# Patient Record
Sex: FEMALE | Race: WHITE | Employment: FULL TIME | URBAN - METROPOLITAN AREA
[De-identification: names, ages, dates, MRNs, and addresses within clinical notes are randomized per-mention and may not be internally consistent; named-entity substitution may affect disease eponyms.]

---

## 2020-04-16 ENCOUNTER — HOSPITAL ENCOUNTER (EMERGENCY)
Age: 20
Discharge: HOME | End: 2020-04-16
Attending: EMERGENCY MEDICINE
Payer: COMMERCIAL

## 2020-04-16 VITALS
OXYGEN SATURATION: 100 % | WEIGHT: 200 LBS | BODY MASS INDEX: 32.14 KG/M2 | HEIGHT: 66 IN | RESPIRATION RATE: 20 BRPM | TEMPERATURE: 99.3 F | DIASTOLIC BLOOD PRESSURE: 80 MMHG | HEART RATE: 110 BPM | SYSTOLIC BLOOD PRESSURE: 115 MMHG

## 2020-04-16 LAB
A/G RATIO: 1 (ref 1.1–2.2)
ALBUMIN SERPL-MCNC: 4 G/DL (ref 3.4–5)
ALP BLD-CCNC: 77 U/L (ref 40–129)
ALT SERPL-CCNC: 14 U/L (ref 10–40)
ANION GAP SERPL CALCULATED.3IONS-SCNC: 15 MMOL/L (ref 3–16)
AST SERPL-CCNC: 15 U/L (ref 15–37)
BACTERIA: ABNORMAL /HPF
BASOPHILS ABSOLUTE: 0.1 K/UL (ref 0–0.2)
BASOPHILS RELATIVE PERCENT: 1.1 %
BILIRUB SERPL-MCNC: 0.3 MG/DL (ref 0–1)
BILIRUBIN URINE: ABNORMAL
BLOOD, URINE: ABNORMAL
BUN BLDV-MCNC: 9 MG/DL (ref 7–20)
CALCIUM SERPL-MCNC: 9.3 MG/DL (ref 8.3–10.6)
CHLORIDE BLD-SCNC: 102 MMOL/L (ref 99–110)
CLARITY: CLEAR
CO2: 22 MMOL/L (ref 21–32)
COLOR: YELLOW
CREAT SERPL-MCNC: 0.6 MG/DL (ref 0.6–1.1)
EOSINOPHILS ABSOLUTE: 0.1 K/UL (ref 0–0.6)
EOSINOPHILS RELATIVE PERCENT: 0.9 %
EPITHELIAL CELLS, UA: ABNORMAL /HPF (ref 0–5)
GFR AFRICAN AMERICAN: >60
GFR NON-AFRICAN AMERICAN: >60
GLOBULIN: 4.1 G/DL
GLUCOSE BLD-MCNC: 115 MG/DL (ref 70–99)
GLUCOSE URINE: NEGATIVE MG/DL
HCG(URINE) PREGNANCY TEST: NEGATIVE
HCT VFR BLD CALC: 40.6 % (ref 36–48)
HEMOGLOBIN: 13.4 G/DL (ref 12–16)
KETONES, URINE: 40 MG/DL
LEUKOCYTE ESTERASE, URINE: NEGATIVE
LIPASE: 28 U/L (ref 13–60)
LYMPHOCYTES ABSOLUTE: 3.4 K/UL (ref 1–5.1)
LYMPHOCYTES RELATIVE PERCENT: 35 %
MCH RBC QN AUTO: 27.8 PG (ref 26–34)
MCHC RBC AUTO-ENTMCNC: 32.9 G/DL (ref 31–36)
MCV RBC AUTO: 84.6 FL (ref 80–100)
MICROSCOPIC EXAMINATION: YES
MONOCYTES ABSOLUTE: 1 K/UL (ref 0–1.3)
MONOCYTES RELATIVE PERCENT: 10.3 %
NEUTROPHILS ABSOLUTE: 5.2 K/UL (ref 1.7–7.7)
NEUTROPHILS RELATIVE PERCENT: 52.7 %
NITRITE, URINE: NEGATIVE
PDW BLD-RTO: 13.3 % (ref 12.4–15.4)
PH UA: 5.5 (ref 5–8)
PLATELET # BLD: 270 K/UL (ref 135–450)
PMV BLD AUTO: 8.8 FL (ref 5–10.5)
POTASSIUM SERPL-SCNC: 3.3 MMOL/L (ref 3.5–5.1)
PROTEIN UA: NEGATIVE MG/DL
RBC # BLD: 4.8 M/UL (ref 4–5.2)
RBC UA: ABNORMAL /HPF (ref 0–4)
SODIUM BLD-SCNC: 139 MMOL/L (ref 136–145)
SPECIFIC GRAVITY UA: >=1.03 (ref 1–1.03)
TOTAL PROTEIN: 8.1 G/DL (ref 6.4–8.2)
URINE REFLEX TO CULTURE: ABNORMAL
URINE TYPE: ABNORMAL
UROBILINOGEN, URINE: 0.2 E.U./DL
WBC # BLD: 9.8 K/UL (ref 4–11)
WBC UA: ABNORMAL /HPF (ref 0–5)

## 2020-04-16 PROCEDURE — 6370000000 HC RX 637 (ALT 250 FOR IP): Performed by: EMERGENCY MEDICINE

## 2020-04-16 PROCEDURE — 2580000003 HC RX 258: Performed by: EMERGENCY MEDICINE

## 2020-04-16 PROCEDURE — 80053 COMPREHEN METABOLIC PANEL: CPT

## 2020-04-16 PROCEDURE — 81001 URINALYSIS AUTO W/SCOPE: CPT

## 2020-04-16 PROCEDURE — 99283 EMERGENCY DEPT VISIT LOW MDM: CPT

## 2020-04-16 PROCEDURE — 83690 ASSAY OF LIPASE: CPT

## 2020-04-16 PROCEDURE — 84703 CHORIONIC GONADOTROPIN ASSAY: CPT

## 2020-04-16 PROCEDURE — 85025 COMPLETE CBC W/AUTO DIFF WBC: CPT

## 2020-04-16 RX ORDER — SUCRALFATE 1 G/1
1 TABLET ORAL 4 TIMES DAILY
Qty: 120 TABLET | Refills: 0 | Status: SHIPPED | OUTPATIENT
Start: 2020-04-16 | End: 2022-10-06

## 2020-04-16 RX ORDER — POTASSIUM CHLORIDE 20 MEQ/1
40 TABLET, EXTENDED RELEASE ORAL ONCE
Status: COMPLETED | OUTPATIENT
Start: 2020-04-16 | End: 2020-04-16

## 2020-04-16 RX ORDER — SUCRALFATE 1 G/1
1 TABLET ORAL ONCE
Status: COMPLETED | OUTPATIENT
Start: 2020-04-16 | End: 2020-04-16

## 2020-04-16 RX ORDER — MAGNESIUM HYDROXIDE/ALUMINUM HYDROXICE/SIMETHICONE 120; 1200; 1200 MG/30ML; MG/30ML; MG/30ML
5 SUSPENSION ORAL EVERY 6 HOURS PRN
Qty: 1 BOTTLE | Refills: 0 | Status: SHIPPED | OUTPATIENT
Start: 2020-04-16 | End: 2022-10-06

## 2020-04-16 RX ORDER — 0.9 % SODIUM CHLORIDE 0.9 %
1000 INTRAVENOUS SOLUTION INTRAVENOUS ONCE
Status: COMPLETED | OUTPATIENT
Start: 2020-04-16 | End: 2020-04-16

## 2020-04-16 RX ORDER — DROSPIRENONE AND ETHINYL ESTRADIOL 0.02-3(28)
1 KIT ORAL DAILY
COMMUNITY

## 2020-04-16 RX ADMIN — SUCRALFATE 1 G: 1 TABLET ORAL at 07:43

## 2020-04-16 RX ADMIN — LIDOCAINE HYDROCHLORIDE: 20 SOLUTION ORAL; TOPICAL at 07:44

## 2020-04-16 RX ADMIN — SODIUM CHLORIDE 1000 ML: 9 INJECTION, SOLUTION INTRAVENOUS at 07:47

## 2020-04-16 RX ADMIN — POTASSIUM CHLORIDE 40 MEQ: 1500 TABLET, EXTENDED RELEASE ORAL at 07:43

## 2020-04-16 SDOH — HEALTH STABILITY: MENTAL HEALTH: HOW OFTEN DO YOU HAVE A DRINK CONTAINING ALCOHOL?: NEVER

## 2020-04-16 ASSESSMENT — PAIN DESCRIPTION - DESCRIPTORS: DESCRIPTORS: ACHING

## 2020-04-16 ASSESSMENT — PAIN SCALES - GENERAL: PAINLEVEL_OUTOF10: 4

## 2020-04-16 ASSESSMENT — PAIN DESCRIPTION - LOCATION: LOCATION: ABDOMEN

## 2020-04-16 ASSESSMENT — PAIN DESCRIPTION - PAIN TYPE: TYPE: ACUTE PAIN

## 2020-04-16 ASSESSMENT — PAIN DESCRIPTION - FREQUENCY: FREQUENCY: INTERMITTENT

## 2020-04-16 ASSESSMENT — PAIN DESCRIPTION - ORIENTATION: ORIENTATION: UPPER

## 2020-04-16 NOTE — ED PROVIDER NOTES
[address]      CHIEF COMPLAINT  Abdominal Pain (pt comes in with complaints of epigastric pain since sunday night. Pt reports nausea but only clear \"spit up\" this morning. Pt has 99.3 temp here. )       HISTORY OF PRESENT ILLNESS  Bell Tang is a 20 y.o. female  who presents to the ED complaining of abdominal pain. Patient states is been occurring for the past 3 to 4 days. She describes a gnawing, ache in the midepigastrium region. She states it is occurring about 2 times per day. She states it is not necessarily worsened by food. She describes some mild nausea but is still eating normally. She denies any vomiting. She denies diarrhea. She denies fevers, chills, chest pain, or shortness of breath. She denies alcohol use. She does state that she takes frequent NSAIDs for headaches that she has chronically. Denies hematochezia or melena. She states she has had some intermittent issues with lower abdominal pain and diarrhea over the past 5 or 6 months, but nothing currently. Otherwise she states she is passing gas and has not had any abdominal surgeries. She denies dysuria. No other complaints, modifying factors or associated symptoms. I have reviewed the following from the nursing documentation. Past Medical History:   Diagnosis Date    Headache      Past Surgical History:   Procedure Laterality Date    WISDOM TOOTH EXTRACTION       History reviewed. No pertinent family history.   Social History     Socioeconomic History    Marital status: Single     Spouse name: Not on file    Number of children: Not on file    Years of education: Not on file    Highest education level: Not on file   Occupational History    Not on file   Social Needs    Financial resource strain: Not on file    Food insecurity     Worry: Not on file     Inability: Not on file    Transportation needs     Medical: Not on file     Non-medical: Not on file   Tobacco Use    Smoking status: Never Smoker    Smokeless tobacco: Never Used   Substance and Sexual Activity    Alcohol use: Never     Frequency: Never    Drug use: Never    Sexual activity: Not on file   Lifestyle    Physical activity     Days per week: Not on file     Minutes per session: Not on file    Stress: Not on file   Relationships    Social connections     Talks on phone: Not on file     Gets together: Not on file     Attends Judaism service: Not on file     Active member of club or organization: Not on file     Attends meetings of clubs or organizations: Not on file     Relationship status: Not on file    Intimate partner violence     Fear of current or ex partner: Not on file     Emotionally abused: Not on file     Physically abused: Not on file     Forced sexual activity: Not on file   Other Topics Concern    Not on file   Social History Narrative    Not on file     Current Facility-Administered Medications   Medication Dose Route Frequency Provider Last Rate Last Dose    0.9 % sodium chloride bolus  1,000 mL Intravenous Once [facility], DO 1,000 mL/hr at 04/16/20 0747 1,000 mL at 04/16/20 0747     Current Outpatient Medications   Medication Sig Dispense Refill    drospirenone-ethinyl estradiol (LUANNE) 3-0.02 MG per tablet Take 1 tablet by mouth daily      esomeprazole (NEXIUM) 20 MG delayed release capsule Take 1 capsule by mouth every morning (before breakfast) 30 capsule 0    sucralfate (CARAFATE) 1 GM tablet Take 1 tablet by mouth 4 times daily 120 tablet 0    aluminum & magnesium hydroxide-simethicone (MAALOX) 200-200-20 MG/5ML SUSP suspension Take 5 mLs by mouth every 6 hours as needed for Indigestion 1 Bottle 0     No Known Allergies    REVIEW OF SYSTEMS  10 systems reviewed, pertinent positives per HPI otherwise noted to be negative.     PHYSICAL EXAM  /77   Pulse 105   Temp 99.3 °F (37.4 °C) (Oral)   Resp 19   Ht 5' 6\" (1.676 m)   Wt 200 lb (90.7 kg)   LMP 03/25/2020   SpO2 100%   Breastfeeding No

## 2022-10-05 SDOH — HEALTH STABILITY: PHYSICAL HEALTH: ON AVERAGE, HOW MANY MINUTES DO YOU ENGAGE IN EXERCISE AT THIS LEVEL?: 20 MIN

## 2022-10-05 SDOH — HEALTH STABILITY: PHYSICAL HEALTH: ON AVERAGE, HOW MANY DAYS PER WEEK DO YOU ENGAGE IN MODERATE TO STRENUOUS EXERCISE (LIKE A BRISK WALK)?: 2 DAYS

## 2022-10-06 ENCOUNTER — OFFICE VISIT (OUTPATIENT)
Dept: PRIMARY CARE CLINIC | Age: 22
End: 2022-10-06
Payer: COMMERCIAL

## 2022-10-06 VITALS
HEART RATE: 96 BPM | WEIGHT: 200.4 LBS | DIASTOLIC BLOOD PRESSURE: 78 MMHG | HEIGHT: 66 IN | SYSTOLIC BLOOD PRESSURE: 118 MMHG | BODY MASS INDEX: 32.21 KG/M2

## 2022-10-06 DIAGNOSIS — E28.2 PCOS (POLYCYSTIC OVARIAN SYNDROME): ICD-10-CM

## 2022-10-06 DIAGNOSIS — E06.3 HASHIMOTO'S THYROIDITIS: ICD-10-CM

## 2022-10-06 DIAGNOSIS — Z76.89 ENCOUNTER TO ESTABLISH CARE: Primary | ICD-10-CM

## 2022-10-06 DIAGNOSIS — Z23 NEED FOR INFLUENZA VACCINATION: ICD-10-CM

## 2022-10-06 DIAGNOSIS — G90.A POTS (POSTURAL ORTHOSTATIC TACHYCARDIA SYNDROME): ICD-10-CM

## 2022-10-06 PROBLEM — M35.7 BENIGN JOINT HYPERMOBILITY: Status: ACTIVE | Noted: 2022-09-29

## 2022-10-06 PROBLEM — L68.0 HIRSUTISM: Status: ACTIVE | Noted: 2022-10-06

## 2022-10-06 PROBLEM — G43.009 MIGRAINE WITHOUT AURA: Status: ACTIVE | Noted: 2019-03-19

## 2022-10-06 PROCEDURE — G8482 FLU IMMUNIZE ORDER/ADMIN: HCPCS | Performed by: FAMILY MEDICINE

## 2022-10-06 PROCEDURE — 99214 OFFICE O/P EST MOD 30 MIN: CPT | Performed by: FAMILY MEDICINE

## 2022-10-06 PROCEDURE — 1036F TOBACCO NON-USER: CPT | Performed by: FAMILY MEDICINE

## 2022-10-06 PROCEDURE — G8417 CALC BMI ABV UP PARAM F/U: HCPCS | Performed by: FAMILY MEDICINE

## 2022-10-06 PROCEDURE — G8427 DOCREV CUR MEDS BY ELIG CLIN: HCPCS | Performed by: FAMILY MEDICINE

## 2022-10-06 PROCEDURE — 90471 IMMUNIZATION ADMIN: CPT | Performed by: FAMILY MEDICINE

## 2022-10-06 PROCEDURE — 90674 CCIIV4 VAC NO PRSV 0.5 ML IM: CPT | Performed by: FAMILY MEDICINE

## 2022-10-06 RX ORDER — METOPROLOL SUCCINATE 25 MG/1
25 TABLET, EXTENDED RELEASE ORAL DAILY
COMMUNITY
Start: 2022-08-04

## 2022-10-06 RX ORDER — TRIAMCINOLONE ACETONIDE 0.25 MG/G
CREAM TOPICAL
Qty: 15 G | Refills: 1 | Status: SHIPPED | OUTPATIENT
Start: 2022-10-06

## 2022-10-06 RX ORDER — SPIRONOLACTONE 25 MG/1
25 TABLET ORAL DAILY
Qty: 90 TABLET | Refills: 1 | Status: SHIPPED | OUTPATIENT
Start: 2022-10-06

## 2022-10-06 ASSESSMENT — PATIENT HEALTH QUESTIONNAIRE - PHQ9
SUM OF ALL RESPONSES TO PHQ QUESTIONS 1-9: 2
2. FEELING DOWN, DEPRESSED OR HOPELESS: 1
SUM OF ALL RESPONSES TO PHQ9 QUESTIONS 1 & 2: 2
1. LITTLE INTEREST OR PLEASURE IN DOING THINGS: 1
SUM OF ALL RESPONSES TO PHQ QUESTIONS 1-9: 2

## 2022-10-06 ASSESSMENT — ENCOUNTER SYMPTOMS
DIARRHEA: 0
CONSTIPATION: 0
EYE PAIN: 0
ABDOMINAL PAIN: 0
SHORTNESS OF BREATH: 0
COUGH: 0

## 2022-10-06 NOTE — PROGRESS NOTES
Chief Complaint   Patient presents with    Establish Care    Referral - General     Endo. ASSESSMENT/PLAN:  1. Encounter to establish care  VS reviewed     BMI reviewed   All questions answered. F/u discussed. Appropriate healthy lifestyle modifications discussed. 2. Need for influenza vaccination  Administered   - Influenza, FLUCELVAX, (age 10 mo+), IM, PF, 0.5 mL    3. PCOS (polycystic ovarian syndrome)  Diagnosed yrs ago  Periods irregular - BC helps. Follow up PRN  Acne, hirsutism - will try angela, low dose, and follow up in 4-6 weeks to gauge improvement   - Meri Perea MD, Endocrinology, [facility]  - spironolactone (ALDACTONE) 25 MG tablet; Take 1 tablet by mouth daily  Dispense: 90 tablet; Refill: 1    4. POTS (postural orthostatic tachycardia syndrome)  On metoprolol and doing well. Feels less intense flares of headaches and high blood pressure. Will continue monitoring  BP normal today  - CBC with Auto Differential; Future  - Comprehensive Metabolic Panel; Future    5. Hashimoto's thyroiditis - goiter  Ref placed to endo  Not a clear diagnosis in chart. Update labs  - TSH with Reflex; Future  - THYROID PEROXIDASE ANTIBODY; Future  - Meir Perea MD, Endocrinology, [facility]  - T4, Free; Future  - T3; Future         HPI:  Dario Choi is a 20 y.o. (: 2000) here today to establish care. Reviewed past medical history; POTS, Hashimoto's (unclear diagnosis), PCOS. Would like a referral to endocrinology. Currently, feeling ok but worried about weight fluctuations. No dietary changes and no change in activity, but will gain sometimes 20 pounds in 2-3 months. Discussed diet and activity levels and typical meals. She doesn't follow serving sizes with starches and carbs closely and may be more sensitive to gain in the setting of PCOS and thyroid disease.      Would like to update flu shot    Review of Systems   Constitutional:  Negative for appetite change, chills, fatigue and fever. HENT:  Negative for congestion. Eyes:  Negative for pain and visual disturbance. Respiratory:  Negative for cough and shortness of breath. Cardiovascular:  Negative for chest pain and palpitations. Gastrointestinal:  Negative for abdominal pain, constipation and diarrhea. Genitourinary:  Negative for difficulty urinating. Musculoskeletal:  Negative for arthralgias. Skin:  Negative for rash and wound. Neurological:  Negative for dizziness, weakness, light-headedness and headaches. Hematological:  Does not bruise/bleed easily. Psychiatric/Behavioral:  Negative for behavioral problems. Past Medical History:   Diagnosis Date    ADHD (attention deficit hyperactivity disorder)     Not formally diagnosed, psych thinks I maybe have it. Have been prescribed Adderal and Ritalin in the past    Anxiety     Depression     GERD (gastroesophageal reflux disease)     Headache     Hypothyroidism     Goiter, blood work confirmed first in 2015    Irritable bowel syndrome     PCOS (polycystic ovarian syndrome)     POTS (postural orthostatic tachycardia syndrome)        Family History   Adopted: Yes   Problem Relation Age of Onset    Other Mother         No family history available - adopted       Social History     Tobacco Use    Smoking status: Never    Smokeless tobacco: Never   Vaping Use    Vaping Use: Never used   Substance Use Topics    Alcohol use: Yes     Comment: Occasionally/socially    Drug use: Never       New Prescriptions    SPIRONOLACTONE (ALDACTONE) 25 MG TABLET    Take 1 tablet by mouth daily    TRIAMCINOLONE (KENALOG) 0.025 % CREAM    Apply topically 2 times daily.        Meds Prior to visit:  Current Outpatient Medications on File Prior to Visit   Medication Sig Dispense Refill    metoprolol succinate (TOPROL XL) 25 MG extended release tablet Take 25 mg by mouth daily      drospirenone-ethinyl estradiol (LUANNE) 3-0.02 MG per tablet Take 1 tablet by mouth daily       No current facility-administered medications on file prior to visit. No Known Allergies    OBJECTIVE:  /78   Pulse 96   Ht 5' 6\" (1.676 m)   Wt 200 lb 6.4 oz (90.9 kg)   LMP 09/26/2022   BMI 32.35 kg/m²   BP Readings from Last 2 Encounters:   10/06/22 118/78   04/16/20 115/80     Wt Readings from Last 3 Encounters:   10/06/22 200 lb 6.4 oz (90.9 kg)   04/16/20 200 lb (90.7 kg)       Physical Exam  Vitals reviewed. Constitutional:       General: She is not in acute distress. Appearance: Normal appearance. She is obese. She is not ill-appearing. HENT:      Head: Normocephalic and atraumatic. Right Ear: External ear normal.      Left Ear: External ear normal.      Nose: Nose normal. No congestion. Mouth/Throat:      Mouth: Mucous membranes are moist.      Pharynx: Oropharynx is clear. No oropharyngeal exudate. Eyes:      Extraocular Movements: Extraocular movements intact. Conjunctiva/sclera: Conjunctivae normal.      Pupils: Pupils are equal, round, and reactive to light. Cardiovascular:      Rate and Rhythm: Normal rate and regular rhythm. Pulses: Normal pulses. Heart sounds: Normal heart sounds. Pulmonary:      Effort: Pulmonary effort is normal. No respiratory distress. Breath sounds: Normal breath sounds. No stridor. No wheezing, rhonchi or rales. Abdominal:      General: Bowel sounds are normal.      Palpations: Abdomen is soft. Tenderness: There is no abdominal tenderness. Musculoskeletal:         General: Normal range of motion. Cervical back: Normal range of motion and neck supple. Right lower leg: No edema. Left lower leg: No edema. Skin:     General: Skin is warm. Capillary Refill: Capillary refill takes less than 2 seconds. Findings: No erythema or rash. Neurological:      General: No focal deficit present. Mental Status: She is alert and oriented to person, place, and time.  Mental status is at baseline. Motor: No weakness. Coordination: Coordination normal.      Gait: Gait normal.   Psychiatric:         Mood and Affect: Mood normal.         Behavior: Behavior normal.         Thought Content: Thought content normal.         Judgment: Judgment normal.       Lab Results   Component Value Date    WBC 9.8 04/16/2020    HGB 13.4 04/16/2020    HCT 40.6 04/16/2020    MCV 84.6 04/16/2020     04/16/2020     Lab Results   Component Value Date     04/16/2020    K 3.3 (L) 04/16/2020     04/16/2020    CO2 22 04/16/2020    BUN 9 04/16/2020    CREATININE 0.6 04/16/2020    GLUCOSE 115 (H) 04/16/2020    CALCIUM 9.3 04/16/2020    PROT 8.1 04/16/2020    LABALBU 4.0 04/16/2020    BILITOT 0.3 04/16/2020    ALKPHOS 77 04/16/2020    AST 15 04/16/2020    ALT 14 04/16/2020    LABGLOM >60 04/16/2020    GFRAA >60 04/16/2020    AGRATIO 1.0 (L) 04/16/2020    GLOB 4.1 04/16/2020       Discussed use, benefit, and side effects of prescribed medications. Barriers to medication compliance addressed. All patient questions answered. Pt voiced understanding. RTC Return in about 6 weeks (around 11/17/2022). No future appointments.     Branden Turcios MD  10/6/2022  11:39 AM

## 2022-10-31 DIAGNOSIS — E28.2 PCOS (POLYCYSTIC OVARIAN SYNDROME): Primary | ICD-10-CM

## 2023-02-14 ENCOUNTER — OFFICE VISIT (OUTPATIENT)
Dept: ENDOCRINOLOGY | Age: 23
End: 2023-02-14
Payer: COMMERCIAL

## 2023-02-14 VITALS
HEART RATE: 92 BPM | TEMPERATURE: 98 F | OXYGEN SATURATION: 99 % | BODY MASS INDEX: 33.91 KG/M2 | WEIGHT: 211 LBS | DIASTOLIC BLOOD PRESSURE: 79 MMHG | SYSTOLIC BLOOD PRESSURE: 125 MMHG | HEIGHT: 66 IN | RESPIRATION RATE: 14 BRPM

## 2023-02-14 DIAGNOSIS — E06.3 HASHIMOTO'S THYROIDITIS: ICD-10-CM

## 2023-02-14 DIAGNOSIS — E28.2 PCOS (POLYCYSTIC OVARIAN SYNDROME): Primary | ICD-10-CM

## 2023-02-14 DIAGNOSIS — E66.9 CLASS 1 OBESITY WITH BODY MASS INDEX (BMI) OF 34.0 TO 34.9 IN ADULT, UNSPECIFIED OBESITY TYPE, UNSPECIFIED WHETHER SERIOUS COMORBIDITY PRESENT: ICD-10-CM

## 2023-02-14 DIAGNOSIS — E04.9 GOITER: ICD-10-CM

## 2023-02-14 DIAGNOSIS — R13.10 DYSPHAGIA, UNSPECIFIED TYPE: ICD-10-CM

## 2023-02-14 DIAGNOSIS — E28.8 HYPERANDROGENISM: ICD-10-CM

## 2023-02-14 DIAGNOSIS — R63.5 WEIGHT GAIN, ABNORMAL: ICD-10-CM

## 2023-02-14 DIAGNOSIS — N91.5 OLIGOMENORRHEA, UNSPECIFIED TYPE: ICD-10-CM

## 2023-02-14 DIAGNOSIS — R00.2 PALPITATIONS: ICD-10-CM

## 2023-02-14 DIAGNOSIS — E88.81 INSULIN RESISTANCE: ICD-10-CM

## 2023-02-14 PROBLEM — E66.811 CLASS 1 OBESITY WITH BODY MASS INDEX (BMI) OF 32.0 TO 32.9 IN ADULT: Status: ACTIVE | Noted: 2023-02-14

## 2023-02-14 PROBLEM — E88.819 INSULIN RESISTANCE: Status: ACTIVE | Noted: 2023-02-14

## 2023-02-14 PROCEDURE — 99205 OFFICE O/P NEW HI 60 MIN: CPT | Performed by: INTERNAL MEDICINE

## 2023-02-14 RX ORDER — DEXAMETHASONE 1 MG
TABLET ORAL
Qty: 1 TABLET | Refills: 0 | Status: SHIPPED | OUTPATIENT
Start: 2023-02-14
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
## 2023-02-24 ENCOUNTER — HOSPITAL ENCOUNTER (OUTPATIENT)
Dept: ULTRASOUND IMAGING | Age: 23
Discharge: HOME | End: 2023-02-24
Payer: COMMERCIAL

## 2023-02-24 DIAGNOSIS — E04.9 GOITER: ICD-10-CM

## 2023-02-24 DIAGNOSIS — R13.10 DYSPHAGIA, UNSPECIFIED TYPE: ICD-10-CM

## 2023-02-24 DIAGNOSIS — N91.5 OLIGOMENORRHEA, UNSPECIFIED TYPE: ICD-10-CM

## 2023-02-24 PROCEDURE — 76536 US EXAM OF HEAD AND NECK: CPT

## 2023-03-16 DIAGNOSIS — R00.2 PALPITATIONS: ICD-10-CM

## 2023-03-16 DIAGNOSIS — E28.8 HYPERANDROGENISM: ICD-10-CM

## 2023-03-16 DIAGNOSIS — E88.81 INSULIN RESISTANCE: ICD-10-CM

## 2023-03-16 DIAGNOSIS — E06.3 HASHIMOTO'S THYROIDITIS: ICD-10-CM

## 2023-03-16 DIAGNOSIS — R63.5 WEIGHT GAIN, ABNORMAL: ICD-10-CM

## 2023-03-16 DIAGNOSIS — E28.2 PCOS (POLYCYSTIC OVARIAN SYNDROME): ICD-10-CM

## 2023-03-16 DIAGNOSIS — N91.5 OLIGOMENORRHEA, UNSPECIFIED TYPE: ICD-10-CM

## 2023-03-16 DIAGNOSIS — E04.9 GOITER: ICD-10-CM

## 2023-03-16 DIAGNOSIS — E66.9 CLASS 1 OBESITY WITH BODY MASS INDEX (BMI) OF 34.0 TO 34.9 IN ADULT, UNSPECIFIED OBESITY TYPE, UNSPECIFIED WHETHER SERIOUS COMORBIDITY PRESENT: ICD-10-CM

## 2023-03-16 LAB
T3 SERPL-MCNC: 1.49 NG/ML (ref 0.8–2)
T3FREE SERPL-MCNC: 3.7 PG/ML (ref 2.3–4.2)
T4 FREE SERPL-MCNC: 1.2 NG/DL (ref 0.9–1.8)
T4 SERPL-MCNC: 7.1 UG/DL (ref 4.5–10.9)
TSH SERPL DL<=0.005 MIU/L-ACNC: 2.97 UIU/ML (ref 0.27–4.2)

## 2023-03-17 ENCOUNTER — TELEPHONE (OUTPATIENT)
Dept: ENDOCRINOLOGY | Age: 23
End: 2023-03-17

## 2023-03-17 DIAGNOSIS — N91.5 OLIGOMENORRHEA, UNSPECIFIED TYPE: ICD-10-CM

## 2023-03-17 DIAGNOSIS — E28.2 PCOS (POLYCYSTIC OVARIAN SYNDROME): ICD-10-CM

## 2023-03-17 DIAGNOSIS — R63.5 WEIGHT GAIN, ABNORMAL: ICD-10-CM

## 2023-03-17 LAB
ALBUMIN SERPL-MCNC: 4.2 G/DL (ref 3.4–5)
ALBUMIN/GLOB SERPL: 1.5 {RATIO} (ref 1.1–2.2)
ALP SERPL-CCNC: 80 U/L (ref 40–129)
ALT SERPL-CCNC: 53 U/L (ref 10–40)
ANION GAP SERPL CALCULATED.3IONS-SCNC: 14 MMOL/L (ref 3–16)
ANTI-THYROGLOB ABS: 22 IU/ML
AST SERPL-CCNC: 34 U/L (ref 15–37)
BILIRUB SERPL-MCNC: <0.2 MG/DL (ref 0–1)
BUN SERPL-MCNC: 12 MG/DL (ref 7–20)
CALCIUM SERPL-MCNC: 9.7 MG/DL (ref 8.3–10.6)
CHLORIDE SERPL-SCNC: 104 MMOL/L (ref 99–110)
CHOLEST SERPL-MCNC: 264 MG/DL (ref 0–199)
CO2 SERPL-SCNC: 25 MMOL/L (ref 21–32)
CORTIS AM PEAK SERPL-MCNC: 18.6 UG/DL (ref 4.3–22.4)
CORTIS AM PEAK SERPL-MCNC: <0.8 UG/DL (ref 4.3–22.4)
CREAT SERPL-MCNC: 0.5 MG/DL (ref 0.6–1.1)
EST. AVERAGE GLUCOSE BLD GHB EST-MCNC: 108.3 MG/DL
ESTRADIOL SERPL-MCNC: 117 PG/ML
FSH SERPL-ACNC: 3 MIU/ML
GFR SERPLBLD CREATININE-BSD FMLA CKD-EPI: >60 ML/MIN/{1.73_M2}
GLUCOSE SERPL-MCNC: 102 MG/DL (ref 70–99)
HBA1C MFR BLD: 5.4 %
HDLC SERPL-MCNC: 72 MG/DL (ref 40–60)
LDL CHOLESTEROL CALCULATED: 179 MG/DL
LH SERPL-ACNC: 8.5 MIU/ML
POTASSIUM SERPL-SCNC: 5.2 MMOL/L (ref 3.5–5.1)
PROGEST SERPL-MCNC: 5.36 NG/ML
PROLACTIN SERPL IA-MCNC: 12.8 NG/ML
PROT SERPL-MCNC: 7 G/DL (ref 6.4–8.2)
SODIUM SERPL-SCNC: 143 MMOL/L (ref 136–145)
THYROPEROXIDASE AB SERPL IA-ACNC: 349 IU/ML
TRIGL SERPL-MCNC: 65 MG/DL (ref 0–150)
VLDLC SERPL CALC-MCNC: 13 MG/DL

## 2023-03-17 NOTE — TELEPHONE ENCOUNTER
Call from [facility] lab with critical lab result     Critical lab: Cortisol AM less than 0.8     Please advise

## 2023-03-18 LAB
C PEPTIDE SERPL-MCNC: 2.9 NG/ML (ref 1.1–4.4)
IGF-I SERPL-MCNC: 448 NG/ML (ref 103–326)
IGF-I Z-SCORE SERPL: 2.7
INSULIN COMMENT: NORMAL
INSULIN REFERENCE RANGE:: NORMAL
INSULIN SERPL-ACNC: 23.5 MU/L
SHBG SERPL-SCNC: 62 NMOL/L (ref 30–135)
TESTOST FREE SERPL-MCNC: 6.5 PG/ML (ref 0.8–7.4)
TESTOST SERPL-MCNC: 55 NG/DL (ref 20–70)

## 2023-03-19 LAB — PROINSULIN P 12H FAST SERPL-SCNC: <1.6 PMOL/L

## 2023-03-20 LAB
ACTH PLAS-MCNC: 49 PG/ML (ref 6–58)
ANNOTATION COMMENT IMP: NORMAL
DHEA-S SERPL-MCNC: 213 UG/DL (ref 65–380)
METANEPHS SERPL-SCNC: 0.18 NMOL/L (ref 0–0.49)
NORMETANEPHRINE SERPL-SCNC: 0.49 NMOL/L (ref 0–0.89)

## 2023-03-21 LAB
17OHP SERPL-MCNC: 119.04 NG/DL
CORTIS SAL-MCNC: 0.06 UG/DL
CORTIS SAL-MCNC: 0.06 UG/DL

## 2023-03-22 LAB — ACTH PLAS-MCNC: <5 PG/ML (ref 6–58)

## 2023-03-30 ENCOUNTER — OFFICE VISIT (OUTPATIENT)
Dept: ENDOCRINOLOGY | Age: 23
End: 2023-03-30
Payer: COMMERCIAL

## 2023-03-30 VITALS
RESPIRATION RATE: 14 BRPM | DIASTOLIC BLOOD PRESSURE: 81 MMHG | SYSTOLIC BLOOD PRESSURE: 125 MMHG | HEART RATE: 95 BPM | WEIGHT: 222 LBS | HEIGHT: 66 IN | OXYGEN SATURATION: 98 % | TEMPERATURE: 98 F | BODY MASS INDEX: 35.68 KG/M2

## 2023-03-30 DIAGNOSIS — R94.6 ABNORMAL THYROID FUNCTION TEST: ICD-10-CM

## 2023-03-30 DIAGNOSIS — R00.2 PALPITATIONS: ICD-10-CM

## 2023-03-30 DIAGNOSIS — E06.3 HASHIMOTO'S THYROIDITIS: ICD-10-CM

## 2023-03-30 DIAGNOSIS — E66.01 CLASS 2 SEVERE OBESITY WITH SERIOUS COMORBIDITY AND BODY MASS INDEX (BMI) OF 35.0 TO 35.9 IN ADULT, UNSPECIFIED OBESITY TYPE (HCC): ICD-10-CM

## 2023-03-30 DIAGNOSIS — E28.2 PCOS (POLYCYSTIC OVARIAN SYNDROME): Primary | ICD-10-CM

## 2023-03-30 DIAGNOSIS — E28.8 HYPERANDROGENISM: ICD-10-CM

## 2023-03-30 DIAGNOSIS — R13.10 DYSPHAGIA, UNSPECIFIED TYPE: ICD-10-CM

## 2023-03-30 DIAGNOSIS — R79.89 ELEVATED INSULIN-LIKE GROWTH FACTOR 1 (IGF-1) LEVEL: ICD-10-CM

## 2023-03-30 DIAGNOSIS — E88.81 INSULIN RESISTANCE: ICD-10-CM

## 2023-03-30 DIAGNOSIS — N91.5 OLIGOMENORRHEA, UNSPECIFIED TYPE: ICD-10-CM

## 2023-03-30 DIAGNOSIS — E78.2 MIXED HYPERLIPIDEMIA: ICD-10-CM

## 2023-03-30 DIAGNOSIS — R74.8 ELEVATED LIVER ENZYMES: ICD-10-CM

## 2023-03-30 DIAGNOSIS — R63.5 WEIGHT GAIN, ABNORMAL: ICD-10-CM

## 2023-03-30 PROBLEM — E66.9 CLASS 1 OBESITY WITH BODY MASS INDEX (BMI) OF 34.0 TO 34.9 IN ADULT: Status: ACTIVE | Noted: 2023-03-30

## 2023-03-30 PROBLEM — E66.811 CLASS 1 OBESITY WITH BODY MASS INDEX (BMI) OF 34.0 TO 34.9 IN ADULT: Status: ACTIVE | Noted: 2023-03-30

## 2023-03-30 PROCEDURE — 99215 OFFICE O/P EST HI 40 MIN: CPT | Performed by: INTERNAL MEDICINE

## 2023-03-30 RX ORDER — METFORMIN HYDROCHLORIDE 500 MG/1
1000 TABLET, EXTENDED RELEASE ORAL
Qty: 360 TABLET | Refills: 1 | Status: SHIPPED | OUTPATIENT
Start: 2023-03-30

## 2023-03-30 NOTE — PROGRESS NOTES
Component Value Date/Time    LABVLDL 13 03/16/2023 10:02 AM     No results found for: [facility]  Lab Results   Component Value Date/Time    LABMICR YES 04/16/2020 06:55 AM     No results found for: VITD25     ASSESSMENT/PLAN:  1. PCOS (polycystic ovarian syndrome)  Start metformin  mg 1 tablet bid, increase if tolerated  Did lab work when off birth control pills for 4 weeks. 17 hydroxyprogesterone normal  Hemoglobin A1c 5.4  - DHEA-Sulfate; Future  - Testosterone, free, total; Future  - Insulin, total; Future  - Comprehensive Metabolic Panel; Future  - Lipid, Fasting; Future  - Hemoglobin A1C; Future    2. Hashimoto's thyroiditis  TSH 1.55-2.97  Thyroglobulin antibody normal  TPO antibody 81.6-349  - T3, Free; Future  - T4, Free; Future  - TSH; Future  - Anti-Thyroglobulin Antibody; Future  - Thyroid Peroxidase Antibody; Future    3. Hyperandrogenism  DHEA-S 403, elevated, repeated 213, normal  Testosterone 55  Free testosterone 6.5  Hold the spironolactone for now, resume when back on birth control pills. - Cortisol AM, Total; Future  - DHEA-Sulfate; Future  - Testosterone, free, total; Future    4. Obesity  Diet and exercise    5. Weight gain, abnormal  Normal 3 salivary cortisol samples  Normal 1 mg overnight dexamethasone suppression test  ACTH 49-<5  Cortisol 18.6-<0.8  Salivary cortisol 0.06-0.064-0.024  Diet and exercise    6. Insulin resistance  Hemoglobin A1c 5.4  Glucose 102  Insulin 23.5  Counseled patient about metformin. Start metformin, increase as tolerated  Diet, exercise  Monitor blood glucose  - Insulin, total; Future  - Comprehensive Metabolic Panel; Future    7. Dysphagia, unspecified type  Obtain thyroid sonogram  - US HEAD NECK SOFT TISSUE THYROID; Future    8. Palpitations  Plasma metanephrines normal  Follow-up with cardiology  - Metanephrines Plasma Free; Future    9.  Oligomenorrhea, unspecified type  Estradiol 117  LH 25.7-8.5  FSH 7.5-3.0  Prolactin 12.8  - DHEA-Sulfate;

## 2023-06-21 LAB
A/G RATIO: 1.4 (CALC) (ref 1–2.5)
ALBUMIN SERPL-MCNC: 4.3 G/DL (ref 3.6–5.1)
ALP BLD-CCNC: 73 U/L (ref 31–125)
ALT SERPL-CCNC: 34 U/L (ref 6–29)
AST SERPL-CCNC: 25 U/L (ref 10–30)
BILIRUB SERPL-MCNC: 0.3 MG/DL (ref 0.2–1.2)
BUN / CREAT RATIO: ABNORMAL (CALC) (ref 6–22)
BUN BLDV-MCNC: 12 MG/DL (ref 7–25)
CALCIUM SERPL-MCNC: 9.8 MG/DL (ref 8.6–10.2)
CHLORIDE BLD-SCNC: 102 MMOL/L (ref 98–110)
CHOLESTEROL, TOTAL: 223 MG/DL
CHOLESTEROL/HDL RATIO: 2.9 (CALC)
CO2: 27 MMOL/L (ref 20–32)
CREAT SERPL-MCNC: 0.6 MG/DL (ref 0.5–0.96)
ESTIMATED GLOMERULAR FILTRATION RATE CREATININE EQUATION: 129 ML/MIN/1.73M2
GLOBULIN: 3.1 G/DL (CALC) (ref 1.9–3.7)
GLUCOSE BLD-MCNC: 94 MG/DL (ref 65–99)
HDLC SERPL-MCNC: 78 MG/DL
INSULIN-LIKE GROWTH FACTOR-1 Z-SCORE: 0.5 SD
INSULIN-LIKE GROWTH FACTOR-1: 254 NG/ML (ref 83–456)
LDL CHOLESTEROL CALCULATED: 131 MG/DL (CALC)
NONHDLC SERPL-MCNC: 145 MG/DL (CALC)
POTASSIUM SERPL-SCNC: 4.4 MMOL/L (ref 3.5–5.3)
SODIUM BLD-SCNC: 139 MMOL/L (ref 135–146)
T3 FREE: 3.3 PG/ML (ref 2.3–4.2)
T4 FREE: 1.6 NG/DL (ref 0.8–1.8)
TOTAL PROTEIN: 7.4 G/DL (ref 6.1–8.1)
TRIGL SERPL-MCNC: 46 MG/DL
TSH ULTRASENSITIVE: 3.01 MIU/L

## 2023-06-22 LAB
COLLECT DURATION TIME SPEC: 24 H
CORTIS F 24H UR HPLC-MCNC: 12.9 UG/L
CORTIS F 24H UR-MRATE: 24.5 UG/D
CORTIS F/CREAT 24H UR: 18.43 UG/G CRT
CREAT 24H UR-MCNC: 70 MG/DL
CREAT 24H UR-MRATE: 1330 MG/D (ref 700–1600)
IMP & REVIEW OF LAB RESULTS: NORMAL
SPECIMEN VOL ?TM UR: 1900 ML

## 2023-06-25 PROBLEM — E66.812 CLASS 2 SEVERE OBESITY WITH SERIOUS COMORBIDITY AND BODY MASS INDEX (BMI) OF 35.0 TO 35.9 IN ADULT (HCC): Status: ACTIVE | Noted: 2023-06-25

## 2023-06-25 PROBLEM — E66.01 CLASS 2 SEVERE OBESITY WITH SERIOUS COMORBIDITY AND BODY MASS INDEX (BMI) OF 35.0 TO 35.9 IN ADULT (HCC): Status: ACTIVE | Noted: 2023-06-25

## 2023-06-26 ENCOUNTER — OFFICE VISIT (OUTPATIENT)
Dept: ENDOCRINOLOGY | Age: 23
End: 2023-06-26
Payer: COMMERCIAL

## 2023-06-26 VITALS
HEIGHT: 66 IN | RESPIRATION RATE: 14 BRPM | HEART RATE: 88 BPM | SYSTOLIC BLOOD PRESSURE: 126 MMHG | DIASTOLIC BLOOD PRESSURE: 77 MMHG | BODY MASS INDEX: 35.84 KG/M2 | WEIGHT: 223 LBS | TEMPERATURE: 98 F | OXYGEN SATURATION: 95 %

## 2023-06-26 DIAGNOSIS — R79.89 ELEVATED INSULIN-LIKE GROWTH FACTOR 1 (IGF-1) LEVEL: ICD-10-CM

## 2023-06-26 DIAGNOSIS — E66.01 CLASS 2 SEVERE OBESITY WITH SERIOUS COMORBIDITY AND BODY MASS INDEX (BMI) OF 35.0 TO 35.9 IN ADULT, UNSPECIFIED OBESITY TYPE (HCC): ICD-10-CM

## 2023-06-26 DIAGNOSIS — E88.81 INSULIN RESISTANCE: ICD-10-CM

## 2023-06-26 DIAGNOSIS — R74.8 ELEVATED LIVER ENZYMES: ICD-10-CM

## 2023-06-26 DIAGNOSIS — E28.2 PCOS (POLYCYSTIC OVARIAN SYNDROME): Primary | ICD-10-CM

## 2023-06-26 DIAGNOSIS — E06.3 HASHIMOTO'S THYROIDITIS: ICD-10-CM

## 2023-06-26 DIAGNOSIS — R94.6 ABNORMAL THYROID FUNCTION TEST: ICD-10-CM

## 2023-06-26 DIAGNOSIS — R00.2 PALPITATIONS: ICD-10-CM

## 2023-06-26 DIAGNOSIS — R63.5 WEIGHT GAIN, ABNORMAL: ICD-10-CM

## 2023-06-26 DIAGNOSIS — N91.5 OLIGOMENORRHEA, UNSPECIFIED TYPE: ICD-10-CM

## 2023-06-26 DIAGNOSIS — E78.2 MIXED HYPERLIPIDEMIA: ICD-10-CM

## 2023-06-26 DIAGNOSIS — E28.8 HYPERANDROGENISM: ICD-10-CM

## 2023-06-26 DIAGNOSIS — R13.10 DYSPHAGIA, UNSPECIFIED TYPE: ICD-10-CM

## 2023-06-26 PROCEDURE — 99215 OFFICE O/P EST HI 40 MIN: CPT | Performed by: INTERNAL MEDICINE

## 2023-06-26 RX ORDER — SEMAGLUTIDE 0.25 MG/.5ML
0.25 INJECTION, SOLUTION SUBCUTANEOUS
Qty: 2 ML | Refills: 0 | Status: SHIPPED | OUTPATIENT
Start: 2023-06-26

## 2023-06-26 RX ORDER — METFORMIN HYDROCHLORIDE 500 MG/1
1000 TABLET, EXTENDED RELEASE ORAL 2 TIMES DAILY
Qty: 360 TABLET | Refills: 1 | Status: SHIPPED | OUTPATIENT
Start: 2023-06-26

## 2023-07-21 DIAGNOSIS — R13.10 DYSPHAGIA, UNSPECIFIED TYPE: ICD-10-CM

## 2023-07-21 DIAGNOSIS — N91.5 OLIGOMENORRHEA, UNSPECIFIED TYPE: ICD-10-CM

## 2023-07-21 DIAGNOSIS — E28.2 PCOS (POLYCYSTIC OVARIAN SYNDROME): ICD-10-CM

## 2023-07-21 DIAGNOSIS — R74.8 ELEVATED LIVER ENZYMES: ICD-10-CM

## 2023-07-21 DIAGNOSIS — E28.8 HYPERANDROGENISM: ICD-10-CM

## 2023-07-21 DIAGNOSIS — R79.89 ELEVATED INSULIN-LIKE GROWTH FACTOR 1 (IGF-1) LEVEL: ICD-10-CM

## 2023-07-21 DIAGNOSIS — R00.2 PALPITATIONS: ICD-10-CM

## 2023-07-21 DIAGNOSIS — E06.3 HASHIMOTO'S THYROIDITIS: ICD-10-CM

## 2023-07-21 DIAGNOSIS — E88.81 INSULIN RESISTANCE: ICD-10-CM

## 2023-07-24 LAB
CORTIS SAL-MCNC: 0.06 UG/DL
CORTIS SAL-MCNC: 0.08 UG/DL

## 2023-07-25 ENCOUNTER — PATIENT MESSAGE (OUTPATIENT)
Dept: ENDOCRINOLOGY | Age: 23
End: 2023-07-25

## 2023-07-25 DIAGNOSIS — R63.5 WEIGHT GAIN, ABNORMAL: ICD-10-CM

## 2023-07-25 DIAGNOSIS — N91.5 OLIGOMENORRHEA, UNSPECIFIED TYPE: ICD-10-CM

## 2023-07-25 DIAGNOSIS — E28.2 PCOS (POLYCYSTIC OVARIAN SYNDROME): ICD-10-CM

## 2023-07-25 DIAGNOSIS — E28.8 HYPERANDROGENISM: Primary | ICD-10-CM

## 2023-07-25 NOTE — TELEPHONE ENCOUNTER
Are you able to scan and attach my 3rd saliva test result from last week for my records? I got two back, but I know we had trouble getting the third in the past. My next appointment isn't until end of Sept, and the results are very helpful in keeping track of which symptoms are indicative of a low vs high. I'm also interested in taking more saliva tests to keep learning about my symptoms. I'm currently journaling all of my symptoms every day, and I'd love to take a test every day for a week, or even a month, to get more data points to connect to my symptoms. Is Dr. Daphnie Flores able to order those, or even have some sort of open order for saliva tests?

## 2023-07-25 NOTE — TELEPHONE ENCOUNTER
From: Juliocesar Guthrie  To: Dr. Pauly Ugalde: 7/25/2023 8:49 AM EDT  Subject: 3rd Saliva Result + Additional    Xavier Seymour/Dr. Lilliam Smith! Are you able to scan and attach my 3rd saliva test result from last week for my records? I got two back, but I know we had trouble getting the third in the past. My next appointment isn't until end of Sept, and the results are very helpful in keeping track of which symptoms are indicative of a low vs high. I'm also interested in taking more saliva tests to keep learning about my symptoms. I'm currently journaling all of my symptoms every day, and I'd love to take a test every day for a week, or even a month, to get more data points to connect to my symptoms. Is Dr. Lilliam Smith able to order those, or even have some sort of open order for saliva tests?

## 2023-07-25 NOTE — TELEPHONE ENCOUNTER
I ordered 3 additional saliva cortisol test and 24-hour urine cortisol. Please do these tests only when you are symptomatic with elevated blood pressure, weight gain, or other symptoms of Cushing's following period without symptoms. Were you able to schedule appointment with Cushing's specialist?  Let me know when, so I can await report.     I will not be ordering any more testing, you will need to address further plan with Cushing's specialist.

## 2023-07-27 NOTE — TELEPHONE ENCOUNTER
ANA  Thank you Sandra! I do have appointments scheduled with [facility] for October 27th and [address] [place] [address] in March 2024.

## 2023-07-31 DIAGNOSIS — R74.8 ELEVATED LIVER ENZYMES: ICD-10-CM

## 2023-07-31 DIAGNOSIS — E28.2 PCOS (POLYCYSTIC OVARIAN SYNDROME): ICD-10-CM

## 2023-07-31 DIAGNOSIS — E88.819 INSULIN RESISTANCE: ICD-10-CM

## 2023-07-31 DIAGNOSIS — E28.8 HYPERANDROGENISM: ICD-10-CM

## 2023-07-31 DIAGNOSIS — R79.89 ELEVATED INSULIN-LIKE GROWTH FACTOR 1 (IGF-1) LEVEL: ICD-10-CM

## 2023-07-31 DIAGNOSIS — N91.5 OLIGOMENORRHEA, UNSPECIFIED TYPE: ICD-10-CM

## 2023-07-31 DIAGNOSIS — R13.10 DYSPHAGIA, UNSPECIFIED TYPE: ICD-10-CM

## 2023-08-02 LAB
CORTIS SAL-MCNC: 0.03 UG/DL
CORTIS SAL-MCNC: 0.03 UG/DL
CORTIS SAL-MCNC: 0.06 UG/DL

## 2023-09-22 DIAGNOSIS — E28.2 PCOS (POLYCYSTIC OVARIAN SYNDROME): ICD-10-CM

## 2023-09-22 DIAGNOSIS — N91.5 OLIGOMENORRHEA, UNSPECIFIED TYPE: ICD-10-CM

## 2023-09-22 DIAGNOSIS — R63.5 WEIGHT GAIN, ABNORMAL: ICD-10-CM

## 2023-09-22 DIAGNOSIS — E28.8 HYPERANDROGENISM: ICD-10-CM

## 2023-09-27 LAB
CORTIS SAL-MCNC: 0.03 UG/DL
CORTIS SAL-MCNC: 0.03 UG/DL
CORTIS SAL-MCNC: 0.05 UG/DL

## 2023-10-01 ENCOUNTER — TELEPHONE (OUTPATIENT)
Dept: ENDOCRINOLOGY | Age: 23
End: 2023-10-01

## 2025-04-06 DIAGNOSIS — R79.89 ELEVATED INSULIN-LIKE GROWTH FACTOR 1 (IGF-1) LEVEL: ICD-10-CM

## 2025-04-06 DIAGNOSIS — E06.3 HASHIMOTO'S THYROIDITIS: ICD-10-CM

## 2025-04-06 DIAGNOSIS — E78.2 MIXED HYPERLIPIDEMIA: ICD-10-CM

## 2025-04-06 DIAGNOSIS — E28.2 PCOS (POLYCYSTIC OVARIAN SYNDROME): ICD-10-CM

## 2025-04-06 DIAGNOSIS — R00.2 PALPITATIONS: ICD-10-CM

## 2025-04-06 DIAGNOSIS — E88.819 INSULIN RESISTANCE: ICD-10-CM

## 2025-04-06 DIAGNOSIS — E28.8 HYPERANDROGENISM: ICD-10-CM

## 2025-04-06 DIAGNOSIS — R13.10 DYSPHAGIA, UNSPECIFIED TYPE: ICD-10-CM

## 2025-04-07 RX ORDER — METFORMIN HYDROCHLORIDE 500 MG/1
1000 TABLET, EXTENDED RELEASE ORAL 2 TIMES DAILY
Qty: 360 TABLET | Refills: 1 | OUTPATIENT
Start: 2025-04-07